# Patient Record
Sex: FEMALE | Race: WHITE | Employment: STUDENT | URBAN - METROPOLITAN AREA
[De-identification: names, ages, dates, MRNs, and addresses within clinical notes are randomized per-mention and may not be internally consistent; named-entity substitution may affect disease eponyms.]

---
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
## 2024-01-25 ENCOUNTER — OFFICE VISIT (OUTPATIENT)
Dept: UROLOGY | Age: 19
End: 2024-01-25
Payer: COMMERCIAL

## 2024-01-25 DIAGNOSIS — N20.0 KIDNEY STONES: Primary | ICD-10-CM

## 2024-01-25 DIAGNOSIS — N20.0 KIDNEY STONES: ICD-10-CM

## 2024-01-25 LAB
ANION GAP SERPL CALC-SCNC: 4 MMOL/L (ref 2–11)
BILIRUBIN, URINE, POC: NEGATIVE
BLOOD URINE, POC: NORMAL
BUN SERPL-MCNC: 9 MG/DL (ref 6–23)
CALCIUM SERPL-MCNC: 8.4 MG/DL (ref 8.3–10.4)
CHLORIDE SERPL-SCNC: 112 MMOL/L (ref 103–113)
CO2 SERPL-SCNC: 23 MMOL/L (ref 21–32)
CREAT SERPL-MCNC: 0.7 MG/DL (ref 0.6–1)
GLUCOSE SERPL-MCNC: 89 MG/DL (ref 65–100)
GLUCOSE URINE, POC: NEGATIVE
KETONES, URINE, POC: NEGATIVE
LEUKOCYTE ESTERASE, URINE, POC: NEGATIVE
NITRITE, URINE, POC: NEGATIVE
PH, URINE, POC: 7 (ref 4.6–8)
POTASSIUM SERPL-SCNC: 3.9 MMOL/L (ref 3.5–5.1)
PROTEIN,URINE, POC: NEGATIVE
SODIUM SERPL-SCNC: 139 MMOL/L (ref 136–146)
SPECIFIC GRAVITY, URINE, POC: 1.02 (ref 1–1.03)
URATE SERPL-MCNC: 3.3 MG/DL (ref 2.6–6)
URINALYSIS CLARITY, POC: NORMAL
URINALYSIS COLOR, POC: NORMAL
UROBILINOGEN, POC: NORMAL

## 2024-01-25 PROCEDURE — G8428 CUR MEDS NOT DOCUMENT: HCPCS | Performed by: NURSE PRACTITIONER

## 2024-01-25 PROCEDURE — 99214 OFFICE O/P EST MOD 30 MIN: CPT | Performed by: NURSE PRACTITIONER

## 2024-01-25 PROCEDURE — G8421 BMI NOT CALCULATED: HCPCS | Performed by: NURSE PRACTITIONER

## 2024-01-25 PROCEDURE — 81003 URINALYSIS AUTO W/O SCOPE: CPT | Performed by: NURSE PRACTITIONER

## 2024-01-25 PROCEDURE — 4004F PT TOBACCO SCREEN RCVD TLK: CPT | Performed by: NURSE PRACTITIONER

## 2024-01-25 PROCEDURE — G8484 FLU IMMUNIZE NO ADMIN: HCPCS | Performed by: NURSE PRACTITIONER

## 2024-01-25 RX ORDER — TAMSULOSIN HYDROCHLORIDE 0.4 MG/1
0.4 CAPSULE ORAL DAILY
Qty: 30 CAPSULE | Refills: 0 | Status: SHIPPED | OUTPATIENT
Start: 2024-01-25

## 2024-01-25 RX ORDER — HYDROCODONE BITARTRATE AND ACETAMINOPHEN 5; 325 MG/1; MG/1
1 TABLET ORAL EVERY 6 HOURS PRN
Qty: 12 TABLET | Refills: 0 | Status: SHIPPED | OUTPATIENT
Start: 2024-01-25 | End: 2024-01-28

## 2024-01-25 ASSESSMENT — ENCOUNTER SYMPTOMS
BACK PAIN: 0
NAUSEA: 0

## 2024-01-25 NOTE — PROGRESS NOTES
Expiration Date:   1/25/2025    Uric Acid     Standing Status:   Future     Number of Occurrences:   1     Standing Expiration Date:   1/25/2025    AMB POC URINALYSIS DIP STICK AUTO W/O MICRO    HYDROcodone-acetaminophen (NORCO) 5-325 MG per tablet     Sig: Take 1 tablet by mouth every 6 hours as needed for Pain for up to 3 days. Max Daily Amount: 4 tablets     Dispense:  12 tablet     Refill:  0     Reduce doses taken as pain becomes manageable    tamsulosin (FLOMAX) 0.4 MG capsule     Sig: Take 1 capsule by mouth daily     Dispense:  30 capsule     Refill:  0       Return in about 10 days (around 2/4/2024) for for KUB and OV.    Total time for today's encounter including chart review, result   review, documentation and face to face encounter was 30 minutes.    EV Bradley    [facility]   [address] [place] [address]  Phone: [phone]  Fax: [phone]     Dr. Holden was supervising physician today and approves plan of care.    Elements of this note have been dictated using speech recognition software.  Although reviewed, errors of speech recognition may have occurred.

## 2024-01-26 DIAGNOSIS — N20.0 KIDNEY STONES: Primary | ICD-10-CM

## 2024-01-26 DIAGNOSIS — N20.0 KIDNEY STONES: ICD-10-CM

## 2024-01-26 LAB
CALCIUM SERPL-MCNC: 8.3 MG/DL (ref 8.3–10.4)
PTH-INTACT SERPL-MCNC: 67.1 PG/ML (ref 18.5–88)

## 2024-02-03 LAB
CALCIUM OXALATE DIHYDRATE MFR STONE IR: 40 %
COLOR STONE: NORMAL
COM MFR STONE: 60 %
LABORATORY COMMENT REPORT: NORMAL
Lab: NORMAL
Lab: NORMAL
PHOTO: NORMAL
SIZE STONE: NORMAL MM
SPECIMEN SOURCE: NORMAL
STONE COMPOSITION: NORMAL
WT STONE: 24 MG

## 2024-02-05 ENCOUNTER — OFFICE VISIT (OUTPATIENT)
Dept: UROLOGY | Age: 19
End: 2024-02-05
Payer: COMMERCIAL

## 2024-02-05 DIAGNOSIS — N20.0 KIDNEY STONES: Primary | ICD-10-CM

## 2024-02-05 LAB
BILIRUBIN, URINE, POC: NEGATIVE
BLOOD URINE, POC: NORMAL
GLUCOSE URINE, POC: NEGATIVE
KETONES, URINE, POC: NEGATIVE
LEUKOCYTE ESTERASE, URINE, POC: NORMAL
NITRITE, URINE, POC: NEGATIVE
PH, URINE, POC: 6 (ref 4.6–8)
PROTEIN,URINE, POC: NEGATIVE
SPECIFIC GRAVITY, URINE, POC: 1.02 (ref 1–1.03)
URINALYSIS CLARITY, POC: NORMAL
URINALYSIS COLOR, POC: NORMAL
UROBILINOGEN, POC: NORMAL

## 2024-02-05 PROCEDURE — 4004F PT TOBACCO SCREEN RCVD TLK: CPT | Performed by: NURSE PRACTITIONER

## 2024-02-05 PROCEDURE — 81003 URINALYSIS AUTO W/O SCOPE: CPT | Performed by: NURSE PRACTITIONER

## 2024-02-05 PROCEDURE — G8428 CUR MEDS NOT DOCUMENT: HCPCS | Performed by: NURSE PRACTITIONER

## 2024-02-05 PROCEDURE — 99214 OFFICE O/P EST MOD 30 MIN: CPT | Performed by: NURSE PRACTITIONER

## 2024-02-05 PROCEDURE — G8484 FLU IMMUNIZE NO ADMIN: HCPCS | Performed by: NURSE PRACTITIONER

## 2024-02-05 PROCEDURE — G8421 BMI NOT CALCULATED: HCPCS | Performed by: NURSE PRACTITIONER

## 2024-02-05 RX ORDER — HYDROCODONE BITARTRATE AND ACETAMINOPHEN 5; 325 MG/1; MG/1
1 TABLET ORAL EVERY 6 HOURS PRN
Qty: 12 TABLET | Refills: 0 | Status: SHIPPED | OUTPATIENT
Start: 2024-02-05 | End: 2024-02-08

## 2024-02-05 ASSESSMENT — ENCOUNTER SYMPTOMS
BACK PAIN: 0
NAUSEA: 0

## 2024-02-05 NOTE — PROGRESS NOTES
[facility]OLOG[facility]  [address] [place] [id number]  [phone]          Khadijah Eller  : 2005    Chief Complaint   Patient presents with    Follow-up    Nephrolithiasis          HPI     Khadijah Eller is a 18 y.o. female    Returns today for follow-up on stone.  At her last visit she had a left distal ureteral stone.  She brought in a small stone fragment that she passed since her previous visit.  Will obtain KUB today.    We also did blood work at her last visit.  We gave her the 24-hour urine collection for her to obtain a metabolic workup.  She has not turned in the urine but will do this soon.    We will also send today stone.  Patient has long history of stone disease.  Her mother is also a patient of mine who has long history of stones.    Past Medical History:   Diagnosis Date    Asthma     Kidney stone      Past Surgical History:   Procedure Laterality Date    TYMPANOSTOMY Bilateral      Current Outpatient Medications   Medication Sig Dispense Refill    HYDROcodone-acetaminophen (NORCO) 5-325 MG per tablet Take 1 tablet by mouth every 6 hours as needed for Pain for up to 3 days. Max Daily Amount: 4 tablets 12 tablet 0    tamsulosin (FLOMAX) 0.4 MG capsule Take 1 capsule by mouth daily 30 capsule 0    albuterol sulfate (PROAIR RESPICLICK) 108 (90 Base) MCG/ACT aerosol powder inhalation Inhale into the lungs every 4 hours as needed for Wheezing or Shortness of Breath      norethindrone-ethinyl estradiol (JUNEL FE 1/20) 1-20 MG-MCG per tablet Take 1 tablet by mouth daily       No current facility-administered medications for this visit.     No Known Allergies  Social History     Socioeconomic History    Marital status: Single     Spouse name: Not on file    Number of children: Not on file    Years of education: Not on file    Highest education level: Not on file   Occupational History    Not on file   Tobacco Use    Smoking status: Never    Smokeless tobacco: Never   Substance

## 2024-04-17 DIAGNOSIS — N20.0 KIDNEY STONES: Primary | ICD-10-CM

## 2024-05-16 ENCOUNTER — TELEPHONE (OUTPATIENT)
Dept: UROLOGY | Age: 19
End: 2024-05-16

## 2024-05-16 DIAGNOSIS — N20.0 KIDNEY STONES: ICD-10-CM

## 2024-05-21 ENCOUNTER — TELEPHONE (OUTPATIENT)
Dept: UROLOGY | Age: 19
End: 2024-05-21

## 2024-05-21 NOTE — TELEPHONE ENCOUNTER
Pts mother, concha called to request a refill on hydrocodone pills. States they have called 3 times and has not been reached back out too.[phone]

## 2024-05-23 ENCOUNTER — OFFICE VISIT (OUTPATIENT)
Dept: UROLOGY | Age: 19
End: 2024-05-23
Payer: COMMERCIAL

## 2024-05-23 DIAGNOSIS — N20.0 KIDNEY STONES: Primary | ICD-10-CM

## 2024-05-23 LAB
AMMONIA 24H UR-SRATE: 30 MMOL/24 HR (ref 15–60)
CALCIUM 24H UR-MRATE: 149 MG/24 HR
CALCIUM PHOSPHATE SATURATION: 1.74 (ref 0.5–2)
CALCIUM/CREAT.RATIO: 80 MG/G CREAT (ref 51–262)
CALCIUM/KG BODY WEIGHT: 2.2 MG/24 HR/KG
CAOX INDEX 24H UR-RTO: 8.64 RATIO (ref 6–10)
CHLORIDE 24H UR-SRATE: 281 MMOL/24 HR (ref 70–250)
CITRATE 24H UR-MRATE: 1176 MG/24 HR
COLLECT DURATION TIME UR: 24 HR
COMMENT: ABNORMAL
CREAT 24H UR-MRATE: 1852 MG/24 HR
CREATININE/KG BODY WEIGHT: 27.4 MG/24 HR/KG (ref 8.7–20.3)
CYSTINE, URINE: ABNORMAL
MAGNESIUM 24H UR-MRATE: 79 MG/24 HR (ref 30–120)
OXALATE 24H UR-MRATE: 71 MG/24 HR (ref 20–40)
PH 24H UR: 6.54 (ref 5.8–6.2)
PHOSPHATE 24H UR-MRATE: 1252 MG/24 HR (ref 600–1200)
POTASSIUM 24H UR-SRATE: 95 MMOL/24 HR (ref 20–100)
PROTEIN CATABOLIC RATE, U: 0.8 G/KG/24 HR (ref 0.8–1.4)
SODIUM 24H UR-SRATE: 274 MMOL/24 HR (ref 50–150)
SPECIMEN VOL 24H UR: 1680 ML/24 HR (ref 500–4000)
SPECIMEN VOL ?TM UR: 60 ML
SULFATE 24H UR-SRATE: 25 MEQ/24 HR (ref 20–80)
URATE 24H UR-MRATE: 715 MG/24 HR
UREA NITROGEN, UR: 7.03 G/24 HR (ref 6–14)
URIC ACID SATURATION: 0.28

## 2024-05-23 PROCEDURE — 99214 OFFICE O/P EST MOD 30 MIN: CPT | Performed by: NURSE PRACTITIONER

## 2024-05-23 RX ORDER — HYDROCHLOROTHIAZIDE 12.5 MG/1
12.5 CAPSULE, GELATIN COATED ORAL EVERY MORNING
Qty: 90 CAPSULE | Refills: 1 | Status: SHIPPED | OUTPATIENT
Start: 2024-05-23

## 2024-05-23 RX ORDER — HYDROCODONE BITARTRATE AND ACETAMINOPHEN 5; 325 MG/1; MG/1
1 TABLET ORAL EVERY 6 HOURS PRN
Qty: 12 TABLET | Refills: 0 | Status: SHIPPED | OUTPATIENT
Start: 2024-05-23 | End: 2024-05-26

## 2024-05-23 RX ORDER — ONDANSETRON 4 MG/1
4 TABLET, FILM COATED ORAL EVERY 8 HOURS PRN
Qty: 15 TABLET | Refills: 0 | Status: SHIPPED | OUTPATIENT
Start: 2024-05-23

## 2024-05-23 ASSESSMENT — ENCOUNTER SYMPTOMS
BACK PAIN: 1
NAUSEA: 0

## 2024-05-23 NOTE — PROGRESS NOTES
Dispense:  90 capsule     Refill:  1       Return in about 4 months (around 9/23/2024) for for KUB and OV.    Total billable time associated with this visit was 30  minutes, during which I coordinated care, counseled the patient and/or family, documented information in the electronic medical record, independently interpreted results, obtained history and performed a medically appropriate examination, ordered medications/tests, reviewed tests and communicated with other health care professionals regarding this patient.     Danielle Franklin, EV    [facility]   [address] [place] [address]  Phone: [phone]  Fax: [phone]     Dr. Holden was supervising physician today and approves plan of care.    Elements of this note have been dictated using speech recognition software.  Although reviewed, errors of speech recognition may have occurred.

## 2024-08-09 NOTE — TELEPHONE ENCOUNTER
Pt called informing us she dropped off the 24 hour urine and she is asking for more norco to [facility]    73

## 2024-09-03 ENCOUNTER — TELEPHONE (OUTPATIENT)
Dept: UROLOGY | Age: 19
End: 2024-09-03

## 2024-09-03 NOTE — TELEPHONE ENCOUNTER
Pt is trying to pass a kidney stone and in lots of pain. Wanting an office visit as soon as possible.

## 2024-09-24 ENCOUNTER — OFFICE VISIT (OUTPATIENT)
Dept: UROLOGY | Age: 19
End: 2024-09-24
Payer: COMMERCIAL

## 2024-09-24 DIAGNOSIS — N20.0 KIDNEY STONES: Primary | ICD-10-CM

## 2024-09-24 LAB
BILIRUBIN, URINE, POC: NEGATIVE
BLOOD URINE, POC: NEGATIVE
GLUCOSE URINE, POC: NEGATIVE
KETONES, URINE, POC: NEGATIVE
LEUKOCYTE ESTERASE, URINE, POC: NORMAL
NITRITE, URINE, POC: NEGATIVE
PH, URINE, POC: 5.5 (ref 4.6–8)
PROTEIN,URINE, POC: NEGATIVE
SPECIFIC GRAVITY, URINE, POC: 1.02 (ref 1–1.03)
URINALYSIS CLARITY, POC: NORMAL
URINALYSIS COLOR, POC: NORMAL
UROBILINOGEN, POC: NORMAL

## 2024-09-24 PROCEDURE — 81003 URINALYSIS AUTO W/O SCOPE: CPT | Performed by: NURSE PRACTITIONER

## 2024-09-24 PROCEDURE — 99214 OFFICE O/P EST MOD 30 MIN: CPT | Performed by: NURSE PRACTITIONER

## 2024-09-24 RX ORDER — HYDROCHLOROTHIAZIDE 25 MG/1
25 TABLET ORAL EVERY MORNING
Qty: 90 TABLET | Refills: 1 | Status: SHIPPED | OUTPATIENT
Start: 2024-09-24

## 2024-09-24 RX ORDER — HYDROCODONE BITARTRATE AND ACETAMINOPHEN 5; 325 MG/1; MG/1
1 TABLET ORAL EVERY 6 HOURS PRN
Qty: 20 TABLET | Refills: 0 | Status: SHIPPED | OUTPATIENT
Start: 2024-09-24 | End: 2024-09-29

## 2024-09-24 ASSESSMENT — ENCOUNTER SYMPTOMS
BACK PAIN: 0
NAUSEA: 0

## 2024-11-05 ENCOUNTER — PATIENT MESSAGE (OUTPATIENT)
Dept: UROLOGY | Age: 19
End: 2024-11-05

## 2024-11-05 DIAGNOSIS — N20.0 KIDNEY STONES: Primary | ICD-10-CM

## 2024-11-11 ENCOUNTER — TELEPHONE (OUTPATIENT)
Dept: UROLOGY | Age: 19
End: 2024-11-11

## 2024-11-22 ENCOUNTER — HOSPITAL ENCOUNTER (OUTPATIENT)
Dept: CT IMAGING | Age: 19
Discharge: HOME | End: 2024-11-22
Payer: COMMERCIAL

## 2024-11-22 DIAGNOSIS — N20.0 KIDNEY STONES: ICD-10-CM

## 2024-11-22 PROCEDURE — 74176 CT ABD & PELVIS W/O CONTRAST: CPT

## 2025-02-05 ENCOUNTER — OFFICE VISIT (OUTPATIENT)
Dept: UROLOGY | Age: 20
End: 2025-02-05
Payer: COMMERCIAL

## 2025-02-05 DIAGNOSIS — N20.0 KIDNEY STONES: Primary | ICD-10-CM

## 2025-02-05 LAB
BILIRUBIN, URINE, POC: NEGATIVE
BLOOD URINE, POC: NORMAL
GLUCOSE URINE, POC: NEGATIVE MG/DL
KETONES, URINE, POC: NEGATIVE MG/DL
LEUKOCYTE ESTERASE, URINE, POC: NORMAL
NITRITE, URINE, POC: NEGATIVE
PH, URINE, POC: 6.5 (ref 4.6–8)
PROTEIN,URINE, POC: NORMAL MG/DL
SPECIFIC GRAVITY, URINE, POC: 1.02 (ref 1–1.03)
URINALYSIS CLARITY, POC: NORMAL
URINALYSIS COLOR, POC: NORMAL
UROBILINOGEN, POC: NORMAL MG/DL

## 2025-02-05 PROCEDURE — 81003 URINALYSIS AUTO W/O SCOPE: CPT | Performed by: NURSE PRACTITIONER

## 2025-02-05 PROCEDURE — 99214 OFFICE O/P EST MOD 30 MIN: CPT | Performed by: NURSE PRACTITIONER

## 2025-02-05 RX ORDER — ONDANSETRON 4 MG/1
4 TABLET, FILM COATED ORAL EVERY 8 HOURS PRN
Qty: 15 TABLET | Refills: 0 | Status: SHIPPED | OUTPATIENT
Start: 2025-02-05

## 2025-02-05 RX ORDER — HYDROCODONE BITARTRATE AND ACETAMINOPHEN 5; 325 MG/1; MG/1
1 TABLET ORAL EVERY 6 HOURS PRN
Qty: 12 TABLET | Refills: 0 | Status: SHIPPED | OUTPATIENT
Start: 2025-02-05 | End: 2025-02-08

## 2025-02-05 RX ORDER — HYDROCHLOROTHIAZIDE 25 MG/1
25 TABLET ORAL EVERY MORNING
Qty: 90 TABLET | Refills: 1 | Status: SHIPPED | OUTPATIENT
Start: 2025-02-05

## 2025-02-05 ASSESSMENT — ENCOUNTER SYMPTOMS: NAUSEA: 0

## 2025-02-05 NOTE — PROGRESS NOTES
[facility]  [address] [place] [id number]  [phone]          Khadijah Eller  : 2005    Chief Complaint   Patient presents with    Follow-up          HPI     Khadijah Eller is a 19 y.o. female  Returns today accompanied by her for follow-up of.  Patient has several stones since her last visit.  She has been a history of renal disease.  She is currently on HCTZ daily.  She is in need of refills of that.  CT that she had obtained it back in November showed nonobstructive bilateral renal stones.  There appears to be more in the left kidney than the right.  Her urine today is clear.  She will occasionally have hematuria prior to passing stones.        Past Medical History:   Diagnosis Date    Asthma     Kidney stone      Past Surgical History:   Procedure Laterality Date    TYMPANOSTOMY Bilateral      Current Outpatient Medications   Medication Sig Dispense Refill    hydroCHLOROthiazide (HYDRODIURIL) 25 MG tablet Take 1 tablet by mouth every morning 90 tablet 1    ondansetron (ZOFRAN) 4 MG tablet Take 1 tablet by mouth every 8 hours as needed for Nausea or Vomiting 15 tablet 0    HYDROcodone-acetaminophen (NORCO) 5-325 MG per tablet Take 1 tablet by mouth every 6 hours as needed for Pain for up to 3 days. Max Daily Amount: 4 tablets 12 tablet 0    tamsulosin (FLOMAX) 0.4 MG capsule Take 1 capsule by mouth daily 30 capsule 0    albuterol sulfate (PROAIR RESPICLICK) 108 (90 Base) MCG/ACT aerosol powder inhalation Inhale into the lungs every 4 hours as needed for Wheezing or Shortness of Breath      norethindrone-ethinyl estradiol (JUNEL FE 1/20) 1-20 MG-MCG per tablet Take 1 tablet by mouth daily       No current facility-administered medications for this visit.     No Known Allergies  Social History     Socioeconomic History    Marital status: Single     Spouse name: Not on file    Number of children: Not on file    Years of education: Not on file    Highest education level: Not